# Patient Record
(demographics unavailable — no encounter records)

---

## 2025-03-31 NOTE — PHYSICAL EXAM
[Hearing [place] Test (Tuning Fork On Forehead)] : no lateralization of tone [FreeTextEntry8] : cerumen removed via curettage  [FreeTextEntry9] : cerumen removed via curettage  [] : septum deviated bilaterally [de-identified] : inflamed turbinates. Epithelial tag on the left side of the columella [Midline] : trachea located in midline position [Removed] : palatine tonsils previously removed [Normal] : no rashes [FreeTextEntry2] : sensations intact. sinuses nontender to percussion

## 2025-03-31 NOTE — ADDENDUM
[FreeTextEntry1] :  Documented by Kenny Benton acting as scribe for Dr. Morales on 03/31/2025. All Medical record entries made by the Scribe were at my, Dr. Morales, direction and personally dictated by me on 03/31/2025 . I have reviewed the chart and agree that the record accurately reflects my personal performance of the history, physical exam, assessment and plan. I have also personally directed, reviewed, and agreed with the discharge instructions.

## 2025-03-31 NOTE — ASSESSMENT
[FreeTextEntry1] :  Reviewed and reconciled medications, allergies, PMHx, PSHx, SocHx, FMHx.  Pt. with h/o TMJ, PND, KELVIN- uses CPAP, epistaxis, chronic rhinitis, and chronic OE presents today for a follow up and to discuss results from compliance report. Patient states he was traveling the past few months, he went to [facility] and [place]. He states he was using his other CPAP machine while he was traveling and he not sure if his other machine is connected to a reader/cloud. Patient states he uses CPAP machine every night and even when he naps on Saturdays. States he uses nasal pillows. Patient states he constantly gets sinus infections since being on CPAP.  Compliance Report 12/12/24 - 03/11/15: -according to the report he is only 21% compliant. Patient states these numbers are not accurate or not reading right. He states he uses his CPAP every night.  -using it almost 9 hours a night on the dates he does us it -AHI: 1.0  Physical exam: -NOSE Score: 10 -TNSS: 1 -right ear: cerumen removed via curettage  -left ear: cerumen removed via curettage  -no tonsils -deviated septum b/l, R> L -inflamed turbinates  -epithelial tag on the left side of the columella  Plan: Compliance Report reviewed with patient. Tasl sent to [facility] regarding compliance report numbers and getting data from his other machine. Start using sinus rinse every night around dinner time - use with distilled water. FU 6 months

## 2025-03-31 NOTE — CONSULT LETTER
[Dear  ___] : Dear  [unfilled], [Courtesy Letter:] : I had the pleasure of seeing your patient, [unfilled], in my office today. [Please see my note below.] : Please see my note below. [Consult Closing:] : Thank you very much for allowing me to participate in the care of this patient.  If you have any questions, please do not hesitate to contact me. [Sincerely,] : Sincerely, [FreeTextEntry3] :  José Manuel Morales MD FACS

## 2025-03-31 NOTE — HISTORY OF PRESENT ILLNESS
[de-identified] : Pt. with h/o TMJ, PND, KELVIN- uses CPAP, epistaxis, chronic rhinitis, and chronic OE presents today for a follow up and to discuss results from compliance report. Patient states he was traveling the past few months, he went to [facility] and [place]. He states he was using his other CPAP machine while he was traveling and he not sure if his other machine is connected to a reader/cloud. Patient states he uses CPAP machine every night and even when he naps on Saturdays. States he uses nasal pillows.  Patient states he constantly gets sinus infections since being on CPAP.

## 2025-07-14 NOTE — HISTORY OF PRESENT ILLNESS
[Shooting] : shooting [Throbbing] : throbbing [Full time] : Work status: full time [de-identified] : 07/14/25: Return visit for this 63 year old male, complaining of spontaneous onset of right lateral elbow pain x last few weeks. Worsened throbbing pain at night. He plays pickleball and swims. No prior injury to right elbow. No NSAIDS.   08/09/22:  Return visit for a 60 year old male RHD here with complaint of recurrent left lateral elbow pain x last 2 weeks duration.  Was treated in October of 2020 with cortisone injection and did well until now.  10/13/20:  Initial visit for this 56 yo male RHD c/o spon. onset of lt elbow pain x last 1 weeks duration after exercising. Constant and daily. Difficulty gripping and grasping. taking advil prn w/ slight relief only. PMH: NO prior issues   [Gradual] : gradual [Sudden] : sudden [3] : 3 [9] : 9 [] : no [FreeTextEntry1] : left elbow /left calf pain  [FreeTextEntry5] : No injury/fall. Reports shooting pain for 2.5 weeks. Throbbing pain at nighttime. No prior treatment. Wearing counterforce brace with some relief.  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

## 2025-07-14 NOTE — PROCEDURE
[Tendon Origin] : tendon origin [Right] : of the right [Lateral Epicondyle] : lateral epicondyle [Pain] : pain [Inflammation] : inflammation [Alcohol] : alcohol [Ethyl Chloride sprayed topically] : ethyl chloride sprayed topically [___ cc    1%] : Lidocaine ~Vcc of 1%  [___ cc    40mg] : Methylprednisolone (Depomedrol) ~Vcc of 40 mg  [] : Patient tolerated procedure well [Call if redness, pain or fever occur] : call if redness, pain or fever occur [Apply ice for 15min out of every hour for the next 12-24 hours as tolerated] : apply ice for 15 minutes out of every hour for the next 12-24 hours as tolerated [Risks, benefits, alternatives discussed / Verbal consent obtained] : the risks benefits, and alternatives have been discussed, and verbal consent was obtained

## 2025-07-14 NOTE — PHYSICAL EXAM
[Normal Mood and Affect] : normal mood and affect [Oriented] : oriented [Able to Communicate] : able to communicate [Well Developed] : well developed [Well Nourished] : well nourished [NL (150)] : flexion 150 degrees [NL (0)] : extension 0 degrees [NL (90)] : supination 90 degrees [5___] : supination 5[unfilled]/5 [] : light touch intact [Right] : right elbow [There are no fractures, subluxations or dislocations. No significant abnormalities are seen] : There are no fractures, subluxations or dislocations. No significant abnormalities are seen

## 2025-07-14 NOTE — PLAN
[TextEntry] : The patient was advised of the diagnosis. The natural history of the pathology was explained in full to the patient in layman's terms. All questions were answered. The risks and benefits of surgical and non-surgical treatment alternatives were explained in full to the patient.  Medication was injected into the right tendon origin LE area. After verbal consent using sterile preparation and technique. The risks, benefits, and alternatives to cortisone injection were explained in full to the patient. Risks outlined include but are not limited to infection, sepsis, bleeding, scarring, skin discoloration, temporary increase in pain, syncopal episode, failure to resolve symptoms, allergic reaction, symptom recurrence, and elevation of blood sugar in diabetics. Patient understood the risks. All questions were answered. After discussion of options, patient requested an injection. Oral informed consent was obtained and sterile prep was done of the injection site. Sterile technique was utilized for the procedure including the preparation of the solutions used for the injection. Patient tolerated the procedure well. Advised to ice the injection site this evening. Prep with Betadine locally to site. Sterile technique used. Patient tolerated procedure well. Post Procedure Instructions: Patient was advised to call if redness, pain, or fever occur and apply ice for 15 min. out of every hour for the next 12-24 hours as tolerated.  The patient was instructed on the importance of ice and elevation of the extremity to decrease swelling and pain.  Recommend over the counter medications on as needed basis.  "Written by Leena Guerrero, acting as Scribe for Mario Holland MD."   Dr. Holland - The documentation recorded by the scribe accurately reflects the service I personally performed, and the decisions made by me.